# Patient Record
Sex: MALE | Race: WHITE | NOT HISPANIC OR LATINO | Employment: FULL TIME | URBAN - METROPOLITAN AREA
[De-identification: names, ages, dates, MRNs, and addresses within clinical notes are randomized per-mention and may not be internally consistent; named-entity substitution may affect disease eponyms.]

---

## 2019-05-29 ENCOUNTER — TRANSFERRED RECORDS (OUTPATIENT)
Dept: HEALTH INFORMATION MANAGEMENT | Facility: CLINIC | Age: 54
End: 2019-05-29

## 2019-12-17 ENCOUNTER — TRANSFERRED RECORDS (OUTPATIENT)
Dept: HEALTH INFORMATION MANAGEMENT | Facility: CLINIC | Age: 54
End: 2019-12-17

## 2022-03-31 ENCOUNTER — OFFICE VISIT (OUTPATIENT)
Dept: FAMILY MEDICINE | Facility: CLINIC | Age: 57
End: 2022-03-31
Payer: COMMERCIAL

## 2022-03-31 VITALS
HEART RATE: 81 BPM | HEIGHT: 72 IN | DIASTOLIC BLOOD PRESSURE: 72 MMHG | BODY MASS INDEX: 25.19 KG/M2 | OXYGEN SATURATION: 97 % | SYSTOLIC BLOOD PRESSURE: 128 MMHG | WEIGHT: 186 LBS

## 2022-03-31 DIAGNOSIS — I82.4Y2 ACUTE DEEP VEIN THROMBOSIS (DVT) OF PROXIMAL VEIN OF LEFT LOWER EXTREMITY (H): Primary | ICD-10-CM

## 2022-03-31 DIAGNOSIS — S99.912A INJURY OF LEFT ANKLE, INITIAL ENCOUNTER: ICD-10-CM

## 2022-03-31 PROCEDURE — 99203 OFFICE O/P NEW LOW 30 MIN: CPT | Performed by: FAMILY MEDICINE

## 2022-03-31 RX ORDER — ALBUTEROL SULFATE 90 UG/1
AEROSOL, METERED RESPIRATORY (INHALATION)
COMMUNITY
End: 2022-03-31

## 2022-03-31 RX ORDER — RIVAROXABAN 20 MG/1
TABLET, FILM COATED ORAL
COMMUNITY
Start: 2022-03-01 | End: 2022-03-31

## 2022-03-31 RX ORDER — RIVAROXABAN 20 MG/1
20 TABLET, FILM COATED ORAL
Qty: 30 TABLET | Refills: 3 | Status: SHIPPED | OUTPATIENT
Start: 2022-03-31 | End: 2023-04-21

## 2022-03-31 NOTE — PROGRESS NOTES
Assessment & Plan     (I82.4Y2) Acute deep vein thrombosis (DVT) of proximal vein of left lower extremity (H)  (primary encounter diagnosis)  Comment: pt has first DVT on left leg after he had left ankle injury and wear immobilized boot. Non smoker and no family history of dvt. He is taking xarelto and no side effect. He over took it for last week. No sob, cough, cp, dizziness etc. He needs to take anticoagulant for 4-6 month.    Plan: XARELTO ANTICOAGULANT 20 MG TABS tablet        Side effect addressed. If he has blood in urine, stool, nose bleeding etc he needs to call.     (S99.912A) Injury of left ankle, initial encounter  Comment: he played volleyball and fell and had left ankle Achilles tendon rupture. Due to the immobilized of the leg he formed DVT.    Plan: continue follow-up with ortho.    956}     BMI:   Estimated body mass index is 25.23 kg/m  as calculated from the following:    Height as of this encounter: 1.829 m (6').    Weight as of this encounter: 84.4 kg (186 lb).    Return if symptoms worsen or fail to improve.    Chloe Partida MD  [facility] ISRRAEL Wheat is a 56 year old who presents for the following health issues     HPI     Pt has dvt on left leg. Reviewed the chart pt had uc visit at 2/28 and Dxed DVT . Started xarelto. He finished the starter pack x 21 days. When he started the continue dose of 20 mg he mistakenly to take it twice a day for one week.   No previous dvt/pe, no family history of dvt/pe. Non smoker. Pt had ankle injury > one month ago  And wear the immobilized boot since.    No cough, sob, wheezing, fainting, cp, dizziness.       Review of Systems   Constitutional, HEENT, cardiovascular, pulmonary, gi and gu systems are negative, except as otherwise noted.      Objective    /72   Pulse 81   Ht 1.829 m (6')   Wt 84.4 kg (186 lb)   SpO2 97%   BMI 25.23 kg/m    Body mass index is 25.23 kg/m .  Physical Exam   GENERAL: healthy, alert  and no distress  NECK: no adenopathy, no asymmetry, masses, or scars and thyroid normal to palpation  RESP: lungs clear to auscultation - no rales, rhonchi or wheezes  CV: regular rate and rhythm, normal S1 S2, no S3 or S4, no murmur, click or rub, no peripheral edema and peripheral pulses strong    Left leg wear immobilized boot.            Answers for HPI/ROS submitted by the patient on 3/31/2022  How many servings of fruits and vegetables do you eat daily?: 0-1  On average, how many sweetened beverages do you drink each day (Examples: soda, juice, sweet tea, etc.  Do NOT count diet or artificially sweetened beverages)?: 1  How many minutes a day do you exercise enough to make your heart beat faster?: 30 to 60  How many days a week do you exercise enough to make your heart beat faster?: 3 or less  How many days per week do you miss taking your medication?: 0  What is the reason for your visit today?: Blood Clot

## 2022-03-31 NOTE — PATIENT INSTRUCTIONS
Patient Education     Complications of Deep Vein Thrombosis   Deep vein thrombosis (DVT) is a condition involving the formation of a blood clot or thrombus in a deep vein. One may develop in a large vein deep inside the leg, arm, or other part of the body. Complications from deep vein thrombosis can be very serious. They can include pulmonary embolism (PE), chronic venous insufficiency, and post-thrombotic syndrome.    You may hear healthcare providers use the term venous thromboembolism (VTE) to describe DVT and PE. They use the term VTE because the two conditions are very closely related. And, because their prevention and treatment are closely related.   Pulmonary embolism   Pulmonary embolism (PE) happens when part of the clot, called an embolus, separates from the vein. It travels to the lungs and cuts off the flow of blood. A PE may develop quickly. It's a medical emergency and may cause death.          Call 911  Call 911 if you have symptoms of a blood clot in the lungs. Symptoms may include:     Chest pain    Trouble breathing or sudden shortness of breath    Coughing (may cough up blood)    Fainting    Fast heartbeat    Sweating  You may have bleeding if you take medicine to help prevent blood clots. Call 911 if you have heavy or uncontrolled bleeding.   When to call your healthcare provider  Call your healthcare provider if you have symptoms of a blood clot. The symptoms include:     Swelling    Pain    Redness in your leg, arm, or other area  Call your healthcare provider if you have signs or symptoms of bleeding, like blood in the urine, bleeding with bowel movements, or bleeding from the nose, gums, a cut, or vagina.   Chronic venous insufficiency and post-thrombotic syndrome   Two other complications of deep vein thrombosis are chronic venous insufficiency and post-thrombotic syndrome.   Chronic venous insufficiency  may happen following deep vein thrombosis of a leg vein. It means that a vein no longer  works as well. It's a long-term condition where blood stays in the vein instead of flowing back to the heart. Pain and swelling in the leg are common symptoms.   Post-thrombotic syndrome may also happen following deep vein thrombosis of a leg vein. It's a long-term problem with pain, swelling, and redness. Ulcers and sores can also happen if the condition is not treated early. These complications and associated symptoms may make it hard to walk and take part in daily activities.   [employer] last reviewed this educational content on 10/1/2019    [id number] [employer]. All rights reserved. This information is not intended as a substitute for professional medical care. Always follow your healthcare professional's instructions.

## 2022-05-01 ENCOUNTER — HEALTH MAINTENANCE LETTER (OUTPATIENT)
Age: 57
End: 2022-05-01

## 2022-11-21 ENCOUNTER — HEALTH MAINTENANCE LETTER (OUTPATIENT)
Age: 57
End: 2022-11-21

## 2023-04-21 ENCOUNTER — OFFICE VISIT (OUTPATIENT)
Dept: FAMILY MEDICINE | Facility: CLINIC | Age: 58
End: 2023-04-21
Payer: COMMERCIAL

## 2023-04-21 VITALS
TEMPERATURE: 98.1 F | SYSTOLIC BLOOD PRESSURE: 144 MMHG | DIASTOLIC BLOOD PRESSURE: 86 MMHG | WEIGHT: 197.8 LBS | OXYGEN SATURATION: 99 % | HEART RATE: 60 BPM | BODY MASS INDEX: 26.79 KG/M2 | HEIGHT: 72 IN

## 2023-04-21 DIAGNOSIS — J30.9 ALLERGIC RHINITIS, UNSPECIFIED SEASONALITY, UNSPECIFIED TRIGGER: ICD-10-CM

## 2023-04-21 DIAGNOSIS — N52.9 ERECTILE DYSFUNCTION, UNSPECIFIED ERECTILE DYSFUNCTION TYPE: ICD-10-CM

## 2023-04-21 DIAGNOSIS — Z76.89 ENCOUNTER TO ESTABLISH CARE: ICD-10-CM

## 2023-04-21 DIAGNOSIS — Z86.718 HISTORY OF DVT (DEEP VEIN THROMBOSIS): ICD-10-CM

## 2023-04-21 DIAGNOSIS — R03.0 ELEVATED BLOOD PRESSURE READING WITHOUT DIAGNOSIS OF HYPERTENSION: ICD-10-CM

## 2023-04-21 DIAGNOSIS — Z13.1 SCREENING FOR DIABETES MELLITUS: ICD-10-CM

## 2023-04-21 DIAGNOSIS — R22.9 LOCALIZED SUPERFICIAL SWELLING, MASS, OR LUMP: ICD-10-CM

## 2023-04-21 DIAGNOSIS — Z13.220 LIPID SCREENING: ICD-10-CM

## 2023-04-21 DIAGNOSIS — M79.671 PAIN OF RIGHT HEEL: ICD-10-CM

## 2023-04-21 DIAGNOSIS — J45.30 MILD PERSISTENT ASTHMA WITHOUT COMPLICATION: Primary | ICD-10-CM

## 2023-04-21 PROBLEM — H40.003 GLAUCOMA SUSPECT, BILATERAL: Status: ACTIVE | Noted: 2023-04-21

## 2023-04-21 PROBLEM — I82.4Y2 ACUTE DEEP VEIN THROMBOSIS (DVT) OF PROXIMAL VEIN OF LEFT LOWER EXTREMITY (H): Status: ACTIVE | Noted: 2023-04-21

## 2023-04-21 PROBLEM — I82.4Y2 ACUTE DEEP VEIN THROMBOSIS (DVT) OF PROXIMAL VEIN OF LEFT LOWER EXTREMITY (H): Status: RESOLVED | Noted: 2023-04-21 | Resolved: 2023-04-21

## 2023-04-21 PROCEDURE — 99214 OFFICE O/P EST MOD 30 MIN: CPT | Performed by: PHYSICIAN ASSISTANT

## 2023-04-21 ASSESSMENT — PAIN SCALES - GENERAL: PAINLEVEL: NO PAIN (0)

## 2023-04-21 NOTE — PATIENT INSTRUCTIONS
How to Check Your Blood Pressure at Home    Make sure your blood pressure cuff is validated (produces accurate readings). Go to validateBP.org for a list of blood pressure cuffs.    Take your blood pressure 2 times right after you wake up and 2 times right before going to bed for a total of 4 readings in one day.  Make sure the readings are before you take your medications, smoke and after you empty your bladder.   Sit with good back support and feet flat on the floor. Have your blood pressure cuff resting at your heart level.

## 2023-04-21 NOTE — PROGRESS NOTES
Assessment & Plan     Encounter to establish care  Scheduled to come back in a few weeks for physical    Mild persistent asthma without complication  Allergic rhinitis, unspecified seasonality, unspecified trigger  Chronic, controlled. Follows with asthma/allergy.    Erectile dysfunction, unspecified erectile dysfunction type  Concern that erections are not as strong over the past year. No relationship concerns. BP elevated today but denies history of HTN. No history of diabetes, hyperlipidemia. Non-smoker. Alcohol - usually 1 daily, sometimes 2. Will check labs and follow-up per results.  - Lipid panel reflex to direct LDL Fasting; Future  - Comprehensive metabolic panel (BMP + Alb, Alk Phos, ALT, AST, Total. Bili, TP); Future  - Hemoglobin A1c; Future  - TSH with free T4 reflex; Future  - Testosterone, total; Future    Lipid screening  He will return for fasting labs.  - Lipid panel reflex to direct LDL Fasting; Future    Screening for diabetes mellitus  - Hemoglobin A1c; Future    Localized superficial swelling, mass, or lump  Suspect cyst vs lipoma, discussed benign nature of these. Will get US for evaluation. He is not sure if he wants removed but can refer for removal if desired.  - US [employer]; Future    Elevated blood pressure reading without diagnosis of hypertension  Denies history of HTN. Will recheck at upcoming physical.    History of DVT (deep vein thrombosis)  Left leg 3/2022 after he had left ankle injury and wore immobilized boot. Non smoker and no family history of DVT. He took xarelto for 6 monthsis taking xarelto and no side effect. He over took it for 6 months and did not need to continue after that.    Pain of right heel  He has had pain on right lateral heel intermittently for past few months. He plays hockey regularly and pain seems to be worse after wearing his hockey skates. Mild discomfort, no injury or trauma. No pain with walking or movement. No swelling, paresthesias. For now, he  would like to monitor as his hockey season is almost over and he will see if improve after he stops wearing skates. Follow-up if worsening or no improvement.      BMI:   Estimated body mass index is 26.83 kg/m  as calculated from the following:    Height as of this encounter: 1.829 m (6').    Weight as of this encounter: 89.7 kg (197 lb 12.8 oz).   Weight management plan: Discussed healthy diet and exercise guidelines      YOLANDE Hagan [facility]    Genesis Wheat is a 57 year old, presenting for the following health issues:  Mass (On torso)        4/21/2023     1:04 PM   Additional Questions   Roomed by VAL Albarado     History of Present Illness       Reason for visit:  Bump on side, checkup  Symptom onset:  More than a monthHe consumes 1 sweetened beverage(s) daily.He exercises with enough effort to increase his heart rate 60 or more minutes per day.  He exercises with enough effort to increase his heart rate 3 or less days per week. He is missing 1 dose(s) of medications per week.  He is not taking prescribed medications regularly due to remembering to take.       New patient - previous care through [facility]  Previous PCP retired  Lives in [place] and would like to be seen here moving forward.  Would like to schedule physical.    History of asthma - follows with pulmonology, Dr. Hammad Al, [facility] [place]. Taking breo ellipta daily and albuterol PRN. Asthma feels well controlled.         View : No data to display.              History of DVT left leg 3/2022 after he had left ankle injury and wore immobilized boot. Non smoker and no family history of DVT. He took xarelto for 6 monthsis taking xarelto and no side effect. He over took it for 6 months and did not need to continue after that.    He has had pain on right lateral heel intermittently for past few months. He plays hockey regularly and pain seems to be worse after  wearing his hockey skates. Mild discomfort, no injury or trauma. No pain with walking or movement. No swelling, paresthesias.    He has had a soft bump on his left side for at least several months. Not painful or bothersome. No redness, warmth, drainage.     Concern with erectile dysfunction. Over the past year, erections have not been as strong as previously. Still able to achieve and maintain erection. No relationship concerns. BP elevated today but denies history of HTN. No history of diabetes, hyperlipidemia. Non-smoker. Alcohol - usually 1 daily, sometimes 2.    Review of Systems   Constitutional, HEENT, cardiovascular, pulmonary, gi and gu systems are negative, except as otherwise noted.      Objective    BP (!) 144/86   Pulse 60   Temp 98.1  F (36.7  C) (Oral)   Ht 1.829 m (6')   Wt 89.7 kg (197 lb 12.8 oz)   SpO2 99%   BMI 26.83 kg/m    Body mass index is 26.83 kg/m .  Physical Exam   GENERAL: healthy, alert and no distress  RESP: lungs clear to auscultation - no rales, rhonchi or wheezes  CV: regular rate and rhythm, normal S1 S2, no S3 or S4, no murmur, click or rub, no peripheral edema and peripheral pulses strong  MS: no gross musculoskeletal defects noted, no edema. Left foot: full ankle ROM, no achilles tenderness, negative Trujillo test, normal DP/PT pulses, no bony tenderness or deformity.  SKIN: There is an approximately 2 cm in diameter soft tissue mass over the right side. It is soft, compressible, and slightly mobile. No erythema, other discoloration, or skin injury over the mass. Non-tender.  NEURO: Normal strength and tone, mentation intact and speech normal  PSYCH: mentation appears normal, affect normal/bright

## 2023-04-24 DIAGNOSIS — R22.9 LOCALIZED SUPERFICIAL SWELLING, MASS, OR LUMP: Primary | ICD-10-CM

## 2023-05-03 ENCOUNTER — HOSPITAL ENCOUNTER (OUTPATIENT)
Dept: ULTRASOUND IMAGING | Facility: CLINIC | Age: 58
Discharge: HOME | End: 2023-05-03
Attending: PHYSICIAN ASSISTANT | Admitting: PHYSICIAN ASSISTANT
Payer: COMMERCIAL

## 2023-05-03 DIAGNOSIS — R22.9 LOCALIZED SUPERFICIAL SWELLING, MASS, OR LUMP: ICD-10-CM

## 2023-05-03 PROCEDURE — 76705 ECHO EXAM OF ABDOMEN: CPT

## 2023-05-09 ASSESSMENT — ENCOUNTER SYMPTOMS
DIARRHEA: 0
CHILLS: 0
WEAKNESS: 0
SORE THROAT: 0
PARESTHESIAS: 0
FREQUENCY: 0
PALPITATIONS: 0
ARTHRALGIAS: 0
JOINT SWELLING: 0
SHORTNESS OF BREATH: 0
HEMATURIA: 0
HEMATOCHEZIA: 0
ABDOMINAL PAIN: 0
DYSURIA: 0
EYE PAIN: 0
CONSTIPATION: 0
MYALGIAS: 0
FEVER: 0
HEARTBURN: 0
COUGH: 0
NERVOUS/ANXIOUS: 0
DIZZINESS: 0
NAUSEA: 0
HEADACHES: 0

## 2023-05-10 ENCOUNTER — OFFICE VISIT (OUTPATIENT)
Dept: FAMILY MEDICINE | Facility: CLINIC | Age: 58
End: 2023-05-10
Payer: COMMERCIAL

## 2023-05-10 VITALS
BODY MASS INDEX: 26.14 KG/M2 | TEMPERATURE: 97.9 F | RESPIRATION RATE: 19 BRPM | WEIGHT: 193 LBS | HEIGHT: 72 IN | OXYGEN SATURATION: 98 % | DIASTOLIC BLOOD PRESSURE: 76 MMHG | HEART RATE: 52 BPM | SYSTOLIC BLOOD PRESSURE: 123 MMHG

## 2023-05-10 DIAGNOSIS — E66.3 OVERWEIGHT (BMI 25.0-29.9): ICD-10-CM

## 2023-05-10 DIAGNOSIS — Z00.00 ROUTINE GENERAL MEDICAL EXAMINATION AT A HEALTH CARE FACILITY: Primary | ICD-10-CM

## 2023-05-10 DIAGNOSIS — Z86.718 HISTORY OF DVT (DEEP VEIN THROMBOSIS): ICD-10-CM

## 2023-05-10 DIAGNOSIS — J45.30 MILD PERSISTENT ASTHMA WITHOUT COMPLICATION: ICD-10-CM

## 2023-05-10 DIAGNOSIS — Z12.5 SCREENING FOR PROSTATE CANCER: ICD-10-CM

## 2023-05-10 DIAGNOSIS — Z13.220 LIPID SCREENING: ICD-10-CM

## 2023-05-10 DIAGNOSIS — R79.89 ELEVATED SERUM CREATININE: Primary | ICD-10-CM

## 2023-05-10 DIAGNOSIS — Z13.1 SCREENING FOR DIABETES MELLITUS: ICD-10-CM

## 2023-05-10 DIAGNOSIS — N52.9 ERECTILE DYSFUNCTION, UNSPECIFIED ERECTILE DYSFUNCTION TYPE: ICD-10-CM

## 2023-05-10 LAB
ALBUMIN SERPL BCG-MCNC: 4.6 G/DL (ref 3.5–5.2)
ALP SERPL-CCNC: 70 U/L (ref 40–129)
ALT SERPL W P-5'-P-CCNC: 19 U/L (ref 10–50)
ANION GAP SERPL CALCULATED.3IONS-SCNC: 13 MMOL/L (ref 7–15)
AST SERPL W P-5'-P-CCNC: 37 U/L (ref 10–50)
BILIRUB SERPL-MCNC: 0.6 MG/DL
BUN SERPL-MCNC: 16.9 MG/DL (ref 6–20)
CALCIUM SERPL-MCNC: 9.2 MG/DL (ref 8.6–10)
CHLORIDE SERPL-SCNC: 104 MMOL/L (ref 98–107)
CHOLEST SERPL-MCNC: 218 MG/DL
CREAT SERPL-MCNC: 1.33 MG/DL (ref 0.67–1.17)
DEPRECATED HCO3 PLAS-SCNC: 25 MMOL/L (ref 22–29)
GFR SERPL CREATININE-BSD FRML MDRD: 62 ML/MIN/1.73M2
GLUCOSE SERPL-MCNC: 96 MG/DL (ref 70–99)
HBA1C MFR BLD: 5 % (ref 0–5.6)
HDLC SERPL-MCNC: 59 MG/DL
LDLC SERPL CALC-MCNC: 137 MG/DL
NONHDLC SERPL-MCNC: 159 MG/DL
POTASSIUM SERPL-SCNC: 4.6 MMOL/L (ref 3.4–5.3)
PROT SERPL-MCNC: 7.2 G/DL (ref 6.4–8.3)
PSA SERPL DL<=0.01 NG/ML-MCNC: 1.37 NG/ML (ref 0–3.5)
SODIUM SERPL-SCNC: 142 MMOL/L (ref 136–145)
TRIGL SERPL-MCNC: 108 MG/DL
TSH SERPL DL<=0.005 MIU/L-ACNC: 1.66 UIU/ML (ref 0.3–4.2)

## 2023-05-10 PROCEDURE — 99214 OFFICE O/P EST MOD 30 MIN: CPT | Mod: 25 | Performed by: PHYSICIAN ASSISTANT

## 2023-05-10 PROCEDURE — 0134A COVID-19 BIVALENT 18+ (MODERNA): CPT | Performed by: PHYSICIAN ASSISTANT

## 2023-05-10 PROCEDURE — 90677 PCV20 VACCINE IM: CPT | Performed by: PHYSICIAN ASSISTANT

## 2023-05-10 PROCEDURE — 84443 ASSAY THYROID STIM HORMONE: CPT | Performed by: PHYSICIAN ASSISTANT

## 2023-05-10 PROCEDURE — 91313 COVID-19 BIVALENT 18+ (MODERNA): CPT | Performed by: PHYSICIAN ASSISTANT

## 2023-05-10 PROCEDURE — 90746 HEPB VACCINE 3 DOSE ADULT IM: CPT | Performed by: PHYSICIAN ASSISTANT

## 2023-05-10 PROCEDURE — 80061 LIPID PANEL: CPT | Performed by: PHYSICIAN ASSISTANT

## 2023-05-10 PROCEDURE — 90472 IMMUNIZATION ADMIN EACH ADD: CPT | Performed by: PHYSICIAN ASSISTANT

## 2023-05-10 PROCEDURE — 36415 COLL VENOUS BLD VENIPUNCTURE: CPT | Performed by: PHYSICIAN ASSISTANT

## 2023-05-10 PROCEDURE — G0103 PSA SCREENING: HCPCS | Performed by: PHYSICIAN ASSISTANT

## 2023-05-10 PROCEDURE — 90471 IMMUNIZATION ADMIN: CPT | Performed by: PHYSICIAN ASSISTANT

## 2023-05-10 PROCEDURE — 80053 COMPREHEN METABOLIC PANEL: CPT | Performed by: PHYSICIAN ASSISTANT

## 2023-05-10 PROCEDURE — 84403 ASSAY OF TOTAL TESTOSTERONE: CPT | Performed by: PHYSICIAN ASSISTANT

## 2023-05-10 PROCEDURE — 83036 HEMOGLOBIN GLYCOSYLATED A1C: CPT | Performed by: PHYSICIAN ASSISTANT

## 2023-05-10 PROCEDURE — 99396 PREV VISIT EST AGE 40-64: CPT | Mod: 25 | Performed by: PHYSICIAN ASSISTANT

## 2023-05-10 RX ORDER — SILDENAFIL 50 MG/1
50 TABLET, FILM COATED ORAL PRN
Qty: 12 TABLET | Refills: 1 | Status: SHIPPED | OUTPATIENT
Start: 2023-05-10 | End: 2023-11-17

## 2023-05-10 ASSESSMENT — ENCOUNTER SYMPTOMS
HEADACHES: 0
FEVER: 0
DIZZINESS: 0
WEAKNESS: 0
HEMATOCHEZIA: 0
PALPITATIONS: 0
COUGH: 0
ABDOMINAL PAIN: 0
DYSURIA: 0
NERVOUS/ANXIOUS: 0
CONSTIPATION: 0
CHILLS: 0
ARTHRALGIAS: 0
SHORTNESS OF BREATH: 0
JOINT SWELLING: 0
MYALGIAS: 0
NAUSEA: 0
HEMATURIA: 0
SORE THROAT: 0
PARESTHESIAS: 0
HEARTBURN: 0
DIARRHEA: 0
FREQUENCY: 0
EYE PAIN: 0

## 2023-05-10 NOTE — LETTER
My Asthma Action Plan    Name: Jarret Hopkins   YOB: 1965  Date: 5/10/2023   My doctor: Missy Mary PA-C   My clinic: [facility]        My Control Medicine: Fluticasone furoate + vilanterol (Breo Ellipta)  -  100/25mcg    My Rescue Medicine: Albuterol (Proair/Ventolin/Proventil HFA) 2-4 puffs EVERY 4 HOURS as needed. Use a spacer if recommended by your provider. My Asthma Severity:   Mild Persistent  Know your asthma triggers:                GREEN ZONE   Good Control  I feel good  No cough or wheeze  Can work, sleep and play without asthma symptoms       Take your asthma control medicine every day.     If exercise triggers your asthma, take your rescue medication  15 minutes before exercise or sports, and  During exercise if you have asthma symptoms  Spacer to use with inhaler: If you have a spacer, make sure to use it with your inhaler             YELLOW ZONE Getting Worse  I have ANY of these:  I do not feel good  Cough or wheeze  Chest feels tight  Wake up at night   Keep taking your Green Zone medications  Start taking your rescue medicine:  every 20 minutes for up to 1 hour. Then every 4 hours for 24-48 hours.  If you stay in the Yellow Zone for more than 12-24 hours, contact your doctor.  If you do not return to the Green Zone in 12-24 hours or you get worse, start taking your oral steroid medicine if prescribed by your provider.           RED ZONE Medical Alert - Get Help  I have ANY of these:  I feel awful  Medicine is not helping  Breathing getting harder  Trouble walking or talking  Nose opens wide to breathe       Take your rescue medicine NOW  If your provider has prescribed an oral steroid medicine, start taking it NOW  Call your doctor NOW  If you are still in the Red Zone after 20 minutes and you have not reached your doctor:  Take your rescue medicine again and  Call 911 or go to the emergency room right away    See your regular doctor within 2 weeks of  an Emergency Room or Urgent Care visit for follow-up treatment.          Annual Reminders:  Meet with Asthma Educator,  Flu Shot in the Fall, consider Pneumonia Vaccination for patients with asthma (aged 19 and older).    Pharmacy: [id number] - [place], [place] - [address] AT [address] [place] [id number] & [facility]    Electronically signed by Missy Mary PA-C   Date: 05/10/23                      Asthma Triggers  How To Control Things That Make Your Asthma Worse    Triggers are things that make your asthma worse.  Look at the list below to help you find your triggers and what you can do about them.  You can help prevent asthma flare-ups by staying away from your triggers.      Trigger                                                          What you can do   Cigarette Smoke  Tobacco smoke can make asthma worse. Do not allow smoking in your home, car or around you.  Be sure no one smokes at a child s day care or school.  If you smoke, ask your health care provider for ways to help you quit.  Ask family members to quit too.  Ask your health care provider for a referral to Quit Plan to help you quit smoking, or call 9-728-030-PLAN.     Colds, Flu, Bronchitis  These are common triggers of asthma. Wash your hands often.  Don t touch your eyes, nose or mouth.  Get a flu shot every year.     Dust Mites  These are tiny bugs that live in cloth or carpet. They are too small to see. Wash sheets and blankets in hot water every week.   Encase pillows and mattress in dust mite proof covers.  Avoid having carpet if you can. If you have carpet, vacuum weekly.   Use a dust mask and HEPA vacuum.   Pollen and Outdoor Mold  Some people are allergic to trees, grass, or weed pollen, or molds. Try to keep your windows closed.  Limit time out doors when pollen count is high.   Ask you health care provider about taking medicine during allergy season.     Animal Dander  Some people are allergic to skin flakes, urine or  saliva from pets with fur or feathers. Keep pets with fur or feathers out of your home.    If you can t keep the pet outdoors, then keep the pet out of your bedroom.  Keep the bedroom door closed.  Keep pets off cloth furniture and away from stuffed toys.     Mice, Rats, and Cockroaches   Some people are allergic to the waste from these pests.   Cover food and garbage.  Clean up spills and food crumbs.  Store grease in the refrigerator.   Keep food out of the bedroom.   Indoor Mold  This can be a trigger if your home has high moisture. Fix leaking faucets, pipes, or other sources of water.   Clean moldy surfaces.  Dehumidify basement if it is damp and smelly.   Smoke, Strong Odors, and Sprays  These can reduce air quality. Stay away from strong odors and sprays, such as perfume, powder, hair spray, paints, smoke incense, paint, cleaning products, candles and new carpet.   Exercise or Sports  Some people with asthma have this trigger. Be active!  Ask your doctor about taking medicine before sports or exercise to prevent symptoms.    Warm up for 5-10 minutes before and after sports or exercise.     Other Triggers of Asthma  Cold air:  Cover your nose and mouth with a scarf.  Sometimes laughing or crying can be a trigger.  Some medicines and food can trigger asthma.

## 2023-05-10 NOTE — PROGRESS NOTES
SUBJECTIVE:   CC: Jarret is an 57 year old who presents for preventative health visit.       5/10/2023     8:39 AM   Additional Questions   Roomed by marya wilkinson         5/10/2023     8:39 AM   Patient Reported Additional Medications   Patient reports taking the following new medications none     Patient has been advised of split billing requirements and indicates understanding: Yes  Healthy Habits:     Getting at least 3 servings of Calcium per day:  Yes    Bi-annual eye exam:  Yes    Dental care twice a year:  Yes    Sleep apnea or symptoms of sleep apnea:  None    Diet:  Regular (no restrictions)    Frequency of exercise:  2-3 days/week    Duration of exercise:  15-30 minutes    Taking medications regularly:  Yes    Medication side effects:  None    PHQ-2 Total Score: 0    Additional concerns today:  Yes    History of asthma - follows with pulmonology, Dr. Hammad Al, [facility] [place]. Taking breo ellipta daily and albuterol PRN. Asthma feels well controlled        5/11/2023    10:07 AM   ACT Total Scores   ACT TOTAL SCORE (Goal Greater than or Equal to 20) 24   In the past 12 months, how many times did you visit the emergency room for your asthma without being admitted to the hospital? 0   In the past 12 months, how many times were you hospitalized overnight because of your asthma? 0     History of DVT left leg 3/2022 after he had left ankle injury and wore immobilized boot. Non smoker and no family history of DVT.  He took xarelto for for 6 months and did not need to continue after that.    Today's PHQ-2 Score:       5/9/2023    10:50 AM   PHQ-2 ( 1999 [employer])   Q1: Little interest or pleasure in doing things 0   Q2: Feeling down, depressed or hopeless 0   PHQ-2 Score 0   Q1: Little interest or pleasure in doing things Not at all   Q2: Feeling down, depressed or hopeless Not at all   PHQ-2 Score 0      Concern with erectile dysfunction. Over the past year, erections have not been as strong as  previously. Still able to achieve and maintain erection. No relationship concerns. No history of HTN. No history of diabetes, hyperlipidemia. Non-smoker. Alcohol - usually 1 daily, sometimes 2. Would like to try Viagra. He does not take any nitrates.    Have you ever done Advance Care Planning? (For example, a Health Directive, POLST, or a discussion with a medical provider or your loved ones about your wishes): No, advance care planning information given to patient to review.  Patient plans to discuss their wishes with loved ones or provider.      Social History     Tobacco Use     Smoking status: Never     Smokeless tobacco: Never   Vaping Use     Vaping status: Never Used   Substance Use Topics     Alcohol use: Yes     Alcohol/week: 7.0 - 10.0 standard drinks of alcohol     Types: 7 - 10 Standard drinks or equivalent per week     Comment: usually 1 drink per day, sometimes 2 drinks per day             5/9/2023    10:50 AM   Alcohol Use   Prescreen: >3 drinks/day or >7 drinks/week? Yes         5/9/2023    10:50 AM   AUDIT - Alcohol Use Disorders Identification Test - Reproduced from the World Health Organization Audit 2001 (Second Edition)   1.  How often do you have a drink containing alcohol? 2 to 3 times a week   2.  How many drinks containing alcohol do you have on a typical day when you are drinking? 1 or 2   3.  How often do you have five or more drinks on one occasion? Never   9.  Have you or someone else been injured because of your drinking? No   10. Has a relative, friend, doctor or other health care worker been concerned about your drinking or suggested you cut down? Yes, during the last year       Last PSA:   Prostate Specific Antigen Screen   Date Value Ref Range Status   05/10/2023 1.37 0.00 - 3.50 ng/mL Final       Reviewed orders with patient. Reviewed health maintenance and updated orders accordingly - Yes  Lab work is in process  Labs reviewed in EPIC  BP Readings from Last 3 Encounters:    05/10/23 123/76   04/21/23 (!) 144/86   03/31/22 128/72    Wt Readings from Last 3 Encounters:   05/10/23 87.5 kg (193 lb)   04/21/23 89.7 kg (197 lb 12.8 oz)   03/31/22 84.4 kg (186 lb)                  Patient Active Problem List   Diagnosis     History of DVT (deep vein thrombosis)     Mild persistent asthma without complication     Allergic rhinitis, unspecified seasonality, unspecified trigger     Glaucoma suspect, bilateral     Erectile dysfunction, unspecified erectile dysfunction type     Overweight (BMI 25.0-29.9)     Past Surgical History:   Procedure Laterality Date     COLONOSCOPY N/A 09/01/2016    Procedure: COLONOSCOPY;  Surgeon: Baldomero Colón MD;  Location: [facility]     ELBOW SURGERY       LASIK Right      ORTHOPEDIC SURGERY      Left knee     ORTHOPEDIC SURGERY Left     ACL repair       Social History     Tobacco Use     Smoking status: Never     Smokeless tobacco: Never   Vaping Use     Vaping status: Never Used   Substance Use Topics     Alcohol use: Yes     Alcohol/week: 7.0 - 10.0 standard drinks of alcohol     Types: 7 - 10 Standard drinks or equivalent per week     Comment: usually 1 drink per day, sometimes 2 drinks per day     Family History   Problem Relation Age of Onset     Heart Disease Mother      Diabetes Father         develop in 80s     Hypertension Father         develop in 80s     Coronary Stenting Brother 59     Coronary Artery Disease Brother         2014 - stint     Colon Cancer No family hx of      Prostate Cancer No family hx of      Breast Cancer No family hx of          Current Outpatient Medications   Medication Sig Dispense Refill     albuterol (PROAIR HFA, PROVENTIL HFA, VENTOLIN HFA) 108 (90 BASE) MCG/ACT inhaler Inhale 2 puffs into the lungs every 6 hours       BREO ELLIPTA 100-25 MCG/INH inhaler INHALE 1 PUFF BY MOUTH EVERY DAY       sildenafil (VIAGRA) 50 MG tablet Take 1 tablet (50 mg) by mouth as needed (erectile dysfunction) Reduce to 25 mg if side effects  occur, can increase to maximum of 100 mg if tolerating well but incomplete response. Take 1 hour prior to sexual activity, take on empty stomach, avoid grapefruit/grapefruit juice and alcohol. Do not take with nitrates, nitroglycerin. 12 tablet 1     No Known Allergies  Recent Labs   Lab Test 05/10/23  0946   A1C 5.0   *   HDL 59   TRIG 108   ALT 19   CR 1.33*   GFRESTIMATED 62   POTASSIUM 4.6   TSH 1.66        Reviewed and updated as needed this visit by clinical staff   Tobacco  Allergies  Meds  Problems  Med Hx  Surg Hx  Fam Hx          Reviewed and updated as needed this visit by Provider   Tobacco  Allergies  Meds  Problems  Med Hx  Surg Hx  Fam Hx         Past Medical History:   Diagnosis Date     Acute deep vein thrombosis (DVT) of proximal vein of left lower extremity (H) 04/21/2023     Benign essential microscopic hematuria      Bursitis of left hip      External hemorrhoids      Hypertension 05/2023     Uncomplicated asthma       Past Surgical History:   Procedure Laterality Date     COLONOSCOPY N/A 09/01/2016    Procedure: COLONOSCOPY;  Surgeon: Baldomero Colón MD;  Location:  GI     ELBOW SURGERY       LASIK Right      ORTHOPEDIC SURGERY      Left knee     ORTHOPEDIC SURGERY Left     ACL repair       Review of Systems   Constitutional: Negative for chills and fever.   HENT: Positive for congestion. Negative for ear pain, hearing loss and sore throat.    Eyes: Negative for pain and visual disturbance.   Respiratory: Negative for cough and shortness of breath.    Cardiovascular: Negative for chest pain, palpitations and peripheral edema.   Gastrointestinal: Negative for abdominal pain, constipation, diarrhea, heartburn, hematochezia and nausea.   Genitourinary: Positive for impotence. Negative for dysuria, frequency, genital sores, hematuria, penile discharge and urgency.   Musculoskeletal: Negative for arthralgias, joint swelling and myalgias.   Skin: Negative for rash.    Neurological: Negative for dizziness, weakness, headaches and paresthesias.   Psychiatric/Behavioral: Negative for mood changes. The patient is not nervous/anxious.        OBJECTIVE:   /76 (BP Location: Right arm, Patient Position: Sitting, Cuff Size: Adult Large)   Pulse 52   Temp 97.9  F (36.6  C) (Tympanic)   Resp 19   Ht 1.829 m (6')   Wt 87.5 kg (193 lb)   SpO2 98%   BMI 26.18 kg/m      Physical Exam  GENERAL: healthy, alert and no distress  EYES: Eyes grossly normal to inspection, PERRL and conjunctivae and sclerae normal  HENT: ear canals and TM's normal, nose and mouth without ulcers or lesions  NECK: no adenopathy, no asymmetry, masses, or scars and thyroid normal to palpation  RESP: lungs clear to auscultation - no rales, rhonchi or wheezes  CV: regular rate and rhythm, normal S1 S2, no S3 or S4, no murmur, click or rub, no peripheral edema and peripheral pulses strong  ABDOMEN: soft, nontender, no hepatosplenomegaly, no masses and bowel sounds normal  MS: no gross musculoskeletal defects noted, no edema  SKIN: no suspicious lesions or rashes; approximately 2 cm in diameter soft tissue mass over the right side. It is soft, compressible, and slightly mobile. No erythema, other discoloration, or skin injury over the mass. Non-tender.  NEURO: Normal strength and tone, mentation intact and speech normal  PSYCH: mentation appears normal, affect normal/bright    Diagnostic Test Results:  Labs reviewed in Epic    ASSESSMENT/PLAN:   Routine general medical examination at a health care facility  Reviewed personal and family history. Reviewed age appropriate screenings. Reviewed healthy BP and BMI ranges. Counseled on lifestyle modifications for optimal mental and physical health.  Discussed age-appropriate health maintenance. Recommended any needed vaccinations. Continue to focus on well balanced diet and exercise.     Screening for prostate cancer  Discussed, would like to screen  - PSA, screen;  Future  - PSA, screen    Mild persistent asthma without complication  Chronic, stable. Follows with pulmonology.    Erectile dysfunction, unspecified erectile dysfunction type  Discussed options, would like to try Viagra. Will start with 50 mg; reduce to 25 mg if side effects occur, can increase to maximum of 100 mg if tolerating well but incomplete response. Discussed that he should take 1 hour prior to sexual activity, take on empty stomach, avoid grapefruit/grapefruit juice and alcohol. Discussed risks/benefits/side effects. He does not take any nitrates. We are also checking labs below.  - sildenafil (VIAGRA) 50 MG tablet; Take 1 tablet (50 mg) by mouth as needed (erectile dysfunction) Reduce to 25 mg if side effects occur, can increase to maximum of 100 mg if tolerating well but incomplete response. Take 1 hour prior to sexual activity, take on empty stomach, avoid grapefruit/grapefruit juice and alcohol. Do not take with nitrates, nitroglycerin.  - Lipid panel reflex to direct LDL Fasting  - Comprehensive metabolic panel (BMP + Alb, Alk Phos, ALT, AST, Total. Bili, TP)  - Hemoglobin A1c  - TSH with free T4 reflex  - Testosterone, total    Lipid screening  He is fasting today  - Lipid panel reflex to direct LDL Fasting    Screening for diabetes mellitus  - Hemoglobin A1c    History of DVT (deep vein thrombosis)  History of DVT left leg 3/2022 after he had left ankle injury and wore immobilized boot. Non smoker and no family history of DVT.  He took xarelto for for 6 months and did not need to continue after that.    Overweight (BMI 25.0-29.9)  Discussed healthy diet/exercise        COUNSELING:   Reviewed preventive health counseling, as reflected in patient instructions        He reports that he has never smoked. He has never used smokeless tobacco.            YOLANDE Hagan [facility]

## 2023-05-11 PROBLEM — N52.9 ERECTILE DYSFUNCTION, UNSPECIFIED ERECTILE DYSFUNCTION TYPE: Status: ACTIVE | Noted: 2023-05-11

## 2023-05-11 PROBLEM — E66.3 OVERWEIGHT (BMI 25.0-29.9): Status: ACTIVE | Noted: 2023-05-11

## 2023-05-11 ASSESSMENT — ASTHMA QUESTIONNAIRES: ACT_TOTALSCORE: 24

## 2023-05-12 LAB — TESTOST SERPL-MCNC: 376 NG/DL (ref 240–950)

## 2023-11-17 DIAGNOSIS — N52.9 ERECTILE DYSFUNCTION, UNSPECIFIED ERECTILE DYSFUNCTION TYPE: ICD-10-CM

## 2023-11-17 RX ORDER — SILDENAFIL 50 MG/1
TABLET, FILM COATED ORAL
Qty: 12 TABLET | Refills: 1 | Status: SHIPPED | OUTPATIENT
Start: 2023-11-17 | End: 2024-05-13

## 2023-11-17 NOTE — TELEPHONE ENCOUNTER
Prescription approved per [facility] Refill Protocol.  Caity Vizcaino, RN  [facility] Triage Nurse

## 2024-02-22 ENCOUNTER — VIRTUAL VISIT (OUTPATIENT)
Dept: FAMILY MEDICINE | Facility: CLINIC | Age: 59
End: 2024-02-22
Payer: COMMERCIAL

## 2024-02-22 DIAGNOSIS — J01.10 ACUTE NON-RECURRENT FRONTAL SINUSITIS: Primary | ICD-10-CM

## 2024-02-22 PROCEDURE — 99213 OFFICE O/P EST LOW 20 MIN: CPT | Mod: 95 | Performed by: NURSE PRACTITIONER

## 2024-02-22 ASSESSMENT — ASTHMA QUESTIONNAIRES
QUESTION_5 LAST FOUR WEEKS HOW WOULD YOU RATE YOUR ASTHMA CONTROL: COMPLETELY CONTROLLED
QUESTION_4 LAST FOUR WEEKS HOW OFTEN HAVE YOU USED YOUR RESCUE INHALER OR NEBULIZER MEDICATION (SUCH AS ALBUTEROL): NOT AT ALL
QUESTION_3 LAST FOUR WEEKS HOW OFTEN DID YOUR ASTHMA SYMPTOMS (WHEEZING, COUGHING, SHORTNESS OF BREATH, CHEST TIGHTNESS OR PAIN) WAKE YOU UP AT NIGHT OR EARLIER THAN USUAL IN THE MORNING: NOT AT ALL
QUESTION_1 LAST FOUR WEEKS HOW MUCH OF THE TIME DID YOUR ASTHMA KEEP YOU FROM GETTING AS MUCH DONE AT WORK, SCHOOL OR AT HOME: NONE OF THE TIME
QUESTION_2 LAST FOUR WEEKS HOW OFTEN HAVE YOU HAD SHORTNESS OF BREATH: NOT AT ALL
ACT_TOTALSCORE: 25
QUESTION_1 LAST FOUR WEEKS HOW MUCH OF THE TIME DID YOUR ASTHMA KEEP YOU FROM GETTING AS MUCH DONE AT WORK, SCHOOL OR AT HOME: NONE OF THE TIME
QUESTION_4 LAST FOUR WEEKS HOW OFTEN HAVE YOU USED YOUR RESCUE INHALER OR NEBULIZER MEDICATION (SUCH AS ALBUTEROL): NOT AT ALL
ACT_TOTALSCORE: 25
QUESTION_2 LAST FOUR WEEKS HOW OFTEN HAVE YOU HAD SHORTNESS OF BREATH: NOT AT ALL
ACT_TOTALSCORE: 24
QUESTION_5 LAST FOUR WEEKS HOW WOULD YOU RATE YOUR ASTHMA CONTROL: WELL CONTROLLED
QUESTION_3 LAST FOUR WEEKS HOW OFTEN DID YOUR ASTHMA SYMPTOMS (WHEEZING, COUGHING, SHORTNESS OF BREATH, CHEST TIGHTNESS OR PAIN) WAKE YOU UP AT NIGHT OR EARLIER THAN USUAL IN THE MORNING: NOT AT ALL

## 2024-02-22 ASSESSMENT — ENCOUNTER SYMPTOMS: SORE THROAT: 1

## 2024-02-22 NOTE — PROGRESS NOTES
Jarret is a 58 year old who is being evaluated via a billable video visit.      How would you like to obtain your AVS? MyChart  If the video visit is dropped, the invitation should be resent by: Text to cell phone: [phone]  Will anyone else be joining your video visit? No          Assessment & Plan     Acute non-recurrent frontal sinusitis  Reviewed treatment plan and role of antibiotics  Instructed to call or return for :  --symptoms not improved in 3 days  --Worsening fever or headaches  --new, unexplained symptoms develop  Encouraged distilled water only and Fany pot.  Over-the-counter Flonase  - amoxicillin-clavulanate (AUGMENTIN) 875-125 MG tablet; Take 1 tablet by mouth 2 times daily        BMI  Estimated body mass index is 26.18 kg/m  as calculated from the following:    Height as of 5/10/23: 1.829 m (6').    Weight as of 5/10/23: 87.5 kg (193 lb).         Subjective   Jarret is a 58 year old, presenting for the following health issues:  Pharyngitis (Congestion ) and Sinus Problem (Possible sinus infection/)      2/22/2024    12:40 PM   Additional Questions   Roomed by Rosi   Accompanied by Self     History of Present Illness       Reason for visit:  Cold symptoms  Symptom onset:  More than a month  Symptoms include:  Sore throat, congestion  Symptom intensity:  Moderate  Symptom progression:  Worsening  Had these symptoms before:  Yes  Has tried/received treatment for these symptoms:  No  What makes it worse:  No  What makes it better:  No    He eats 2-3 servings of fruits and vegetables daily.He consumes 0 sweetened beverage(s) daily.He exercises with enough effort to increase his heart rate 20 to 29 minutes per day.  He exercises with enough effort to increase his heart rate 5 days per week.   He is taking medications regularly.       Video visit completed.     Has not been feeling well for the 7 weeks. Started with bad sore throat. Felt like throat was on fire. Then got bad sinus and chest  discomfort. Was coughing a lot. Week of Jan 8th felt kind of poorly and had to take a day off of work. 1/18/24 did virtual visit. Nyquil, Dayquil,  mucinex DM. 1/29 thorough was really feeling better and doing well. Then on 2/2/24 started to feel poorly again. Using neti pot to clean sinus. Using tap water. Ears feel full. No dizziness. Thought was getting better again. Then today started to feel poorly again. Feels drained. No fever that is aware of. Drainage from nose is very thick.         Objective           Vitals:  No vitals were obtained today due to virtual visit.    Physical Exam  Constitutional:       General: He is not in acute distress.     Appearance: Normal appearance. He is not toxic-appearing.   HENT:      Nose: No congestion.   Eyes:      General: Lids are normal.   Pulmonary:      Effort: Pulmonary effort is normal. No tachypnea, bradypnea or respiratory distress.   Skin:     Coloration: Skin is not ashen, cyanotic, jaundiced or pale.   Neurological:      Mental Status: He is alert.   Psychiatric:         Mood and Affect: Mood normal.         Speech: Speech normal.         Behavior: Behavior normal. Behavior is cooperative.              Video-Visit Details    Type of service:  Video Visit     Originating Location (pt. Location): Home    Distant Location (provider location):  Off-site  Platform used for Video Visit: Reed  Signed Electronically by: CINDY Cronin CNP

## 2024-05-13 DIAGNOSIS — N52.9 ERECTILE DYSFUNCTION, UNSPECIFIED ERECTILE DYSFUNCTION TYPE: ICD-10-CM

## 2024-05-13 RX ORDER — SILDENAFIL 50 MG/1
TABLET, FILM COATED ORAL
Qty: 12 TABLET | Refills: 1 | Status: SHIPPED | OUTPATIENT
Start: 2024-05-13 | End: 2024-08-21

## 2024-05-24 SDOH — HEALTH STABILITY: PHYSICAL HEALTH: ON AVERAGE, HOW MANY DAYS PER WEEK DO YOU ENGAGE IN MODERATE TO STRENUOUS EXERCISE (LIKE A BRISK WALK)?: 2 DAYS

## 2024-05-24 SDOH — HEALTH STABILITY: PHYSICAL HEALTH: ON AVERAGE, HOW MANY MINUTES DO YOU ENGAGE IN EXERCISE AT THIS LEVEL?: 60 MIN

## 2024-05-24 ASSESSMENT — SOCIAL DETERMINANTS OF HEALTH (SDOH): HOW OFTEN DO YOU GET TOGETHER WITH FRIENDS OR RELATIVES?: ONCE A WEEK

## 2024-05-29 ENCOUNTER — OFFICE VISIT (OUTPATIENT)
Dept: FAMILY MEDICINE | Facility: CLINIC | Age: 59
End: 2024-05-29
Payer: COMMERCIAL

## 2024-05-29 VITALS
WEIGHT: 200 LBS | OXYGEN SATURATION: 98 % | DIASTOLIC BLOOD PRESSURE: 84 MMHG | HEART RATE: 52 BPM | HEIGHT: 72 IN | TEMPERATURE: 97.9 F | SYSTOLIC BLOOD PRESSURE: 130 MMHG | RESPIRATION RATE: 16 BRPM | BODY MASS INDEX: 27.09 KG/M2

## 2024-05-29 DIAGNOSIS — N52.9 ERECTILE DYSFUNCTION, UNSPECIFIED ERECTILE DYSFUNCTION TYPE: ICD-10-CM

## 2024-05-29 DIAGNOSIS — R03.0 ELEVATED BP WITHOUT DIAGNOSIS OF HYPERTENSION: ICD-10-CM

## 2024-05-29 DIAGNOSIS — Z12.5 SCREENING FOR PROSTATE CANCER: ICD-10-CM

## 2024-05-29 DIAGNOSIS — Z13.220 LIPID SCREENING: ICD-10-CM

## 2024-05-29 DIAGNOSIS — Z00.00 ROUTINE GENERAL MEDICAL EXAMINATION AT A HEALTH CARE FACILITY: Primary | ICD-10-CM

## 2024-05-29 DIAGNOSIS — E66.3 OVERWEIGHT (BMI 25.0-29.9): ICD-10-CM

## 2024-05-29 PROCEDURE — 99396 PREV VISIT EST AGE 40-64: CPT | Mod: 25 | Performed by: PHYSICIAN ASSISTANT

## 2024-05-29 PROCEDURE — 90746 HEPB VACCINE 3 DOSE ADULT IM: CPT | Performed by: PHYSICIAN ASSISTANT

## 2024-05-29 PROCEDURE — 90472 IMMUNIZATION ADMIN EACH ADD: CPT | Performed by: PHYSICIAN ASSISTANT

## 2024-05-29 PROCEDURE — 90480 ADMN SARSCOV2 VAC 1/ONLY CMP: CPT | Performed by: PHYSICIAN ASSISTANT

## 2024-05-29 PROCEDURE — 36415 COLL VENOUS BLD VENIPUNCTURE: CPT | Performed by: PHYSICIAN ASSISTANT

## 2024-05-29 PROCEDURE — 80048 BASIC METABOLIC PNL TOTAL CA: CPT | Performed by: PHYSICIAN ASSISTANT

## 2024-05-29 PROCEDURE — G0103 PSA SCREENING: HCPCS | Performed by: PHYSICIAN ASSISTANT

## 2024-05-29 PROCEDURE — 91320 SARSCV2 VAC 30MCG TRS-SUC IM: CPT | Performed by: PHYSICIAN ASSISTANT

## 2024-05-29 PROCEDURE — 90471 IMMUNIZATION ADMIN: CPT | Performed by: PHYSICIAN ASSISTANT

## 2024-05-29 PROCEDURE — 80061 LIPID PANEL: CPT | Performed by: PHYSICIAN ASSISTANT

## 2024-05-29 PROCEDURE — 90715 TDAP VACCINE 7 YRS/> IM: CPT | Performed by: PHYSICIAN ASSISTANT

## 2024-05-29 NOTE — PROGRESS NOTES
Preventive Care Visit  [facility] ALLYSONMOJAMISON Mary PA-C, Family Medicine  May 29, 2024      Assessment & Plan     Routine general medical examination at a health care facility  - Basic metabolic panel  (Ca, Cl, CO2, Creat, Gluc, K, Na, BUN); Future  - Basic metabolic panel  (Ca, Cl, CO2, Creat, Gluc, K, Na, BUN)    Lipid screening  He is fasting today  - Lipid panel reflex to direct LDL Fasting; Future  - Lipid panel reflex to direct LDL Fasting    Screening for prostate cancer  Discussed, agrees  - PSA, screen; Future  - PSA, screen    Elevated BP without diagnosis of hypertension  Elevated initially but lower on recheck today. BP was also elevated 4/2023 but normal 5/2023. Discussed healthy lifestyle, recommend monitoring BP at home and follow-up if elevated.    Erectile dysfunction, unspecified erectile dysfunction type  Uses sildenafil 50 mg PRN, works well, no side effects or concerns. Does not need refill today but can call for refills when needed.    Overweight (BMI 25.0-29.9)  BMI  Estimated body mass index is 27.12 kg/m  as calculated from the following:    Height as of this encounter: 1.829 m (6').    Weight as of this encounter: 90.7 kg (200 lb).   Weight management plan: Discussed healthy diet and exercise guidelines    Counseling  Appropriate preventive services were discussed with this patient.  Checklist reviewing preventive services available has been given to the patient.      Genesis Wheat is a 58 year old, presenting for the following:  Physical (Not fasting)        5/29/2024     2:16 PM   Additional Questions   Roomed by shaye          HPI    History of asthma - follows with pulmonology, Dr. Hammad Al, [facility]. Taking breo ellipta daily and albuterol PRN. Asthma feels well controlled.      5/11/2023    10:07 AM 2/22/2024    12:40 PM 2/22/2024    12:41 PM   ACT Total Scores   ACT TOTAL SCORE (Goal Greater than or Equal to 20) 24 24 25   In  the past 12 months, how many times did you visit the emergency room for your asthma without being admitted to the hospital? 0 0 0   In the past 12 months, how many times were you hospitalized overnight because of your asthma? 0 0 0           5/24/2024   General Health   How would you rate your overall physical health? Good   Feel stress (tense, anxious, or unable to sleep) To some extent   (!) STRESS CONCERN      5/24/2024   Nutrition   Three or more servings of calcium each day? Yes   Diet: Regular (no restrictions)   How many servings of fruit and vegetables per day? (!) 2-3   How many sweetened beverages each day? (!) 2         5/24/2024   Exercise   Days per week of moderate/strenous exercise 2 days   Average minutes spent exercising at this level 60 min   (!) EXERCISE CONCERN      5/24/2024   Social Factors   Frequency of gathering with friends or relatives Once a week   Worry food won't last until get money to buy more No   Food not last or not have enough money for food? No   Do you have housing?  Yes   Are you worried about losing your housing? No   Lack of transportation? No   Unable to get utilities (heat,electricity)? No         5/24/2024   Fall Risk   Fallen 2 or more times in the past year? No   Trouble with walking or balance? No          5/24/2024   Dental   Dentist two times every year? Yes         5/24/2024   TB Screening   Were you born outside of the [place]? No           Today's PHQ-2 Score:       2/22/2024    12:41 PM   PHQ-2 ( 1999 [employer])   Q1: Little interest or pleasure in doing things 0   Q2: Feeling down, depressed or hopeless 0   PHQ-2 Score 0         5/24/2024   Substance Use   Alcohol more than 3/day or more than 7/wk Yes   How often do you have a drink containing alcohol 4 or more times a week   How many alcohol drinks on typical day 1 or 2   How often do you have 5+ drinks at one occasion Less than monthly   Audit 2/3 Score 1   How often not able to stop drinking once started Less than  monthly   How often failed to do what normally expected Never   How often needed first drink in am after a heavy drinking session Never   How often feeling of guilt or remorse after drinking Less than monthly   How often unable to remember what happened the night before Never   Have you or someone else been injured because of your drinking No   Has anyone been concerned or suggested you cut down on drinking No   TOTAL SCORE - AUDIT 7   Do you use any other substances recreationally? No     Social History     Tobacco Use    Smoking status: Never     Passive exposure: Never    Smokeless tobacco: Never   Vaping Use    Vaping status: Never Used   Substance Use Topics    Alcohol use: Yes     Alcohol/week: 7.0 - 10.0 standard drinks of alcohol     Types: 7 - 10 Standard drinks or equivalent per week     Comment: usually 1 drink per day, sometimes 2 drinks per day    Drug use: No             5/24/2024   One time HIV Screening   Previous HIV test? Yes         5/24/2024   STI Screening   New sexual partner(s) since last STI/HIV test? No   Last PSA:   Prostate Specific Antigen Screen   Date Value Ref Range Status   05/10/2023 1.37 0.00 - 3.50 ng/mL Final     ASCVD Risk   The 10-year ASCVD risk score (Kaleb CARRION, et al., 2019) is: 7.2%    Values used to calculate the score:      Age: 58 years      Sex: Male      Is Non- : No      Diabetic: No      Tobacco smoker: No      Systolic Blood Pressure: 130 mmHg      Is BP treated: No      HDL Cholesterol: 59 mg/dL      Total Cholesterol: 218 mg/dL         Reviewed and updated as needed this visit by Provider   Tobacco  Allergies  Meds  Problems  Med Hx  Surg Hx  Fam Hx            Past Medical History:   Diagnosis Date    Acute deep vein thrombosis (DVT) of proximal vein of left lower extremity (H) 04/21/2023    Benign essential microscopic hematuria     Bursitis of left hip     External hemorrhoids     Hypertension 05/2023    Uncomplicated  asthma      Past Surgical History:   Procedure Laterality Date    COLONOSCOPY N/A 09/01/2016    Procedure: COLONOSCOPY;  Surgeon: Baldomero Colón MD;  Location: [facility]    ELBOW SURGERY      LASIK Right     ORTHOPEDIC SURGERY      Left knee    ORTHOPEDIC SURGERY Left     ACL repair     Lab work is in process  Labs reviewed in EPIC  BP Readings from Last 3 Encounters:   05/29/24 130/84   05/10/23 123/76   04/21/23 (!) 144/86    Wt Readings from Last 3 Encounters:   05/29/24 90.7 kg (200 lb)   05/10/23 87.5 kg (193 lb)   04/21/23 89.7 kg (197 lb 12.8 oz)                  Patient Active Problem List   Diagnosis    History of DVT (deep vein thrombosis)    Mild persistent asthma without complication    Allergic rhinitis, unspecified seasonality, unspecified trigger    Glaucoma suspect, bilateral    Erectile dysfunction, unspecified erectile dysfunction type    Overweight (BMI 25.0-29.9)     Past Surgical History:   Procedure Laterality Date    COLONOSCOPY N/A 09/01/2016    Procedure: COLONOSCOPY;  Surgeon: Baldomero Colón MD;  Location: [facility]    ELBOW SURGERY      LASIK Right     ORTHOPEDIC SURGERY      Left knee    ORTHOPEDIC SURGERY Left     ACL repair       Social History     Tobacco Use    Smoking status: Never     Passive exposure: Never    Smokeless tobacco: Never   Substance Use Topics    Alcohol use: Yes     Alcohol/week: 7.0 - 10.0 standard drinks of alcohol     Types: 7 - 10 Standard drinks or equivalent per week     Comment: usually 1 drink per day, sometimes 2 drinks per day     Family History   Problem Relation Age of Onset    Heart Disease Mother     Macular Degeneration Mother     Diabetes Father         develop in 80s    Hypertension Father         develop in 80s    Dementia Father     Skin Cancer Father     Coronary Stenting Brother 59    Coronary Artery Disease Brother         2014 - stent    Colon Cancer No family hx of     Prostate Cancer No family hx of     Breast Cancer No family hx of           Current Outpatient Medications   Medication Sig Dispense Refill    albuterol (PROAIR HFA, PROVENTIL HFA, VENTOLIN HFA) 108 (90 BASE) MCG/ACT inhaler Inhale 2 puffs into the lungs every 6 hours      Boswellia-Glucosamine-Vit D (OSTEO BI-FLEX ONE PER DAY PO)       BREO ELLIPTA 100-25 MCG/INH inhaler INHALE 1 PUFF BY MOUTH EVERY DAY      COLLAGEN PO       sildenafil (VIAGRA) 50 MG tablet TAKE 1/2 TO 2 TABLETS BY MOUTH 1 HOUR BEFORE SEXUAL ACTIVITY ON AN EMPTY STOMACH. AVOID GRAPEFRUIT AND ALCOHOL. DO NOT TAKE NITRATES/ 12 tablet 1    TURMERIC PO        No Known Allergies  Recent Labs   Lab Test 05/10/23  0946   A1C 5.0   *   HDL 59   TRIG 108   ALT 19   CR 1.33*   GFRESTIMATED 62   POTASSIUM 4.6   TSH 1.66          Review of Systems  Constitutional, HEENT, cardiovascular, pulmonary, gi and gu systems are negative, except as otherwise noted.     Objective    Exam  /84   Pulse 52   Temp 97.9  F (36.6  C)   Resp 16   Ht 1.829 m (6')   Wt 90.7 kg (200 lb)   SpO2 98%   BMI 27.12 kg/m     Estimated body mass index is 27.12 kg/m  as calculated from the following:    Height as of this encounter: 1.829 m (6').    Weight as of this encounter: 90.7 kg (200 lb).    Physical Exam  GENERAL: alert and no distress  EYES: Eyes grossly normal to inspection, PERRL and conjunctivae and sclerae normal  HENT: ear canals and TM's normal, nose and mouth without ulcers or lesions  NECK: no adenopathy, no asymmetry, masses, or scars  RESP: lungs clear to auscultation - no rales, rhonchi or wheezes  CV: regular rate and rhythm, normal S1 S2, no S3 or S4, no murmur, click or rub, no peripheral edema  ABDOMEN: soft, nontender, no hepatosplenomegaly, no masses and bowel sounds normal  MS: no gross musculoskeletal defects noted, no edema  SKIN: no suspicious lesions or rashes  NEURO: Normal strength and tone, mentation intact and speech normal  PSYCH: mentation appears normal, affect normal/bright        Signed Electronically  by: Missy Mary PA-C

## 2024-05-29 NOTE — PATIENT INSTRUCTIONS
"How to Check Your Blood Pressure at Home    Make sure your blood pressure cuff is validated (produces accurate readings). Go to validateBP.org for a list of blood pressure cuffs.     Take your blood pressure right after you wake up and right before going to bed.     Make sure the readings are before you take your medications, smoke and after you empty your bladder.   Sit with good back support and feet flat on the floor. Have your blood pressure cuff resting at your heart level.     If your numbers are consistently above 140/90, please schedule a follow-up visit to discuss.     Preventive Care Advice   This is general advice we often give to help people stay healthy. Your care team may have specific advice just for you. Please talk to your care team about your own preventive care needs.  Lifestyle  Exercise at least 150 minutes each week (30 minutes a day, 5 days a week).  Do muscle strengthening activities 2 days a week. These help control your weight and prevent disease.  No smoking.  Wear sunscreen to prevent skin cancer.  Have your home tested for radon every 2 to 5 years. Radon is a colorless, odorless gas that can harm your lungs. To learn more, go to www.health.state.[place]. and search for \"Radon in Homes.\"  Keep guns unloaded and locked up in a safe place like a safe or gun vault, or, use a gun lock and hide the keys. Always lock away bullets separately. To learn more, visit [employer].[place].gov and search for \"safe gun storage.\"  Nutrition  Eat 5 or more servings of fruits and vegetables each day.  Try wheat bread, brown rice and whole grain pasta (instead of white bread, rice, and pasta).  Get enough calcium and vitamin D. Check the label on foods and aim for 100% of the RDA (recommended daily allowance).  Regular exams  Have a dental exam and cleaning every 6 months.  See your health care team every year to talk about:  Any changes in your health.  Any medicines your care team has prescribed.  Preventive care, family " planning, and ways to prevent chronic diseases.  Shots (vaccines)   HPV shots (up to age 26), if you've never had them before.  Hepatitis B shots (up to age 59), if you've never had them before.  COVID-19 shot: Get this shot when it's due.  Flu shot: Get a flu shot every year.  Tetanus shot: Get a tetanus shot every 10 years.  Pneumococcal, hepatitis A, and RSV shots: Ask your care team if you need these based on your risk.  Shingles shot (for age 50 and up).  General health tests  Diabetes screening:  Starting at age 35, Get screened for diabetes at least every 3 years.  If you are younger than age 35, ask your care team if you should be screened for diabetes.  Cholesterol test: At age 39, start having a cholesterol test every 5 years, or more often if advised.  Bone density scan (DEXA): At age 50, ask your care team if you should have this scan for osteoporosis (brittle bones).  Hepatitis C: Get tested at least once in your life.  Abdominal aortic aneurysm screening: Talk to your doctor about having this screening if you:  Have ever smoked; and  Are biologically male; and  Are between the ages of 65 and 75.  STIs (sexually transmitted infections)  Before age 24: Ask your care team if you should be screened for STIs.  After age 24: Get screened for STIs if you're at risk. You are at risk for STIs (including HIV) if:  You are sexually active with more than one person.  You don't use condoms every time.  You or a partner was diagnosed with a sexually transmitted infection.  If you are at risk for HIV, ask about PrEP medicine to prevent HIV.  Get tested for HIV at least once in your life, whether you are at risk for HIV or not.  Cancer screening tests  Cervical cancer screening: If you have a cervix, begin getting regular cervical cancer screening tests at age 21. Most people who have regular screenings with normal results can stop after age 65. Talk about this with your provider.  Breast cancer scan (mammogram): If  you've ever had breasts, begin having regular mammograms starting at age 40. This is a scan to check for breast cancer.  Colon cancer screening: It is important to start screening for colon cancer at age 45.  Have a colonoscopy test every 10 years (or more often if you're at risk) Or, ask your provider about stool tests like a FIT test every year or Cologuard test every 3 years.  To learn more about your testing options, visit: www.zLense/455196.pdf.  For help making a decision, visit: mika/wj34821.  Prostate cancer screening test: If you have a prostate and are age 55 to 69, ask your provider if you would benefit from a yearly prostate cancer screening test.  Lung cancer screening: If you are a current or former smoker age 50 to 80, ask your care team if ongoing lung cancer screenings are right for you.  For informational purposes only. Not to replace the advice of your health care provider. Copyright   2023 [employer]. All rights reserved. Clinically reviewed by the [facility] Transitions Program. [employer] 660896 - REV 04/24.    Learning About Stress  What is stress?     Stress is your body's response to a hard situation. Your body can have a physical, emotional, or mental response. Stress is a fact of life for most people, and it affects everyone differently. What causes stress for you may not be stressful for someone else.  A lot of things can cause stress. You may feel stress when you go on a job interview, take a test, or run a race. This kind of short-term stress is normal and even useful. It can help you if you need to work hard or react quickly. For example, stress can help you finish an important job on time.  Long-term stress is caused by ongoing stressful situations or events. Examples of long-term stress include long-term health problems, ongoing problems at work, or conflicts in your family. Long-term stress can harm your health.  How does stress affect your health?  When  you are stressed, your body responds as though you are in danger. It makes hormones that speed up your heart, make you breathe faster, and give you a burst of energy. This is called the fight-or-flight stress response. If the stress is over quickly, your body goes back to normal and no harm is done.  But if stress happens too often or lasts too long, it can have bad effects. Long-term stress can make you more likely to get sick, and it can make symptoms of some diseases worse. If you tense up when you are stressed, you may develop neck, shoulder, or low back pain. Stress is linked to high blood pressure and heart disease.  Stress also harms your emotional health. It can make you pavon, tense, or depressed. Your relationships may suffer, and you may not do well at work or school.  What can you do to manage stress?  You can try these things to help manage stress:   Do something active. Exercise or activity can help reduce stress. Walking is a great way to get started. Even everyday activities such as housecleaning or yard work can help.  Try yoga or arik chi. These techniques combine exercise and meditation. You may need some training at first to learn them.  Do something you enjoy. For example, listen to music or go to a movie. Practice your hobby or do volunteer work.  Meditate. This can help you relax, because you are not worrying about what happened before or what may happen in the future.  Do guided imagery. Imagine yourself in any setting that helps you feel calm. You can use online videos, books, or a teacher to guide you.  Do breathing exercises. For example:  From a standing position, bend forward from the waist with your knees slightly bent. Let your arms dangle close to the floor.  Breathe in slowly and deeply as you return to a standing position. Roll up slowly and lift your head last.  Hold your breath for just a few seconds in the standing position.  Breathe out slowly and bend forward from the  "waist.  Let your feelings out. Talk, laugh, cry, and express anger when you need to. Talking with supportive friends or family, a counselor, or a leonidas leader about your feelings is a healthy way to relieve stress. Avoid discussing your feelings with people who make you feel worse.  Write. It may help to write about things that are bothering you. This helps you find out how much stress you feel and what is causing it. When you know this, you can find better ways to cope.  What can you do to prevent stress?  You might try some of these things to help prevent stress:  Manage your time. This helps you find time to do the things you want and need to do.  Get enough sleep. Your body recovers from the stresses of the day while you are sleeping.  Get support. Your family, friends, and community can make a difference in how you experience stress.  Limit your news feed. Avoid or limit time on social media or news that may make you feel stressed.  Do something active. Exercise or activity can help reduce stress. Walking is a great way to get started.  Where can you learn more?  Go to https://www.ZipRecruiter.net/patiented  Enter N032 in the search box to learn more about \"Learning About Stress.\"  Current as of: October 24, 2023               Content Version: 14.0    0285-8212 [employer].   Care instructions adapted under license by your healthcare professional. If you have questions about a medical condition or this instruction, always ask your healthcare professional. [employer] disclaims any warranty or liability for your use of this information.      Learning About Alcohol Use Disorder  What is alcohol use disorder?  Alcohol use disorder means that a person drinks alcohol even though it causes harm to themselves or others. It can range from mild to severe. The more symptoms of this disorder you have, the more severe it may be. People who have it may find it hard to control their use of " alcohol.  People who have this disorder may argue with others about how much they're drinking. Their job may be affected because of drinking. They may drink when it's dangerous or illegal, such as when they drive. They also may have a strong need, or craving, to drink. They may feel like they must drink just to get by. Their drinking may increase their risk of getting hurt or being in a car crash.  Over time, drinking too much alcohol may cause health problems. These may include high blood pressure, liver problems, or problems with digestion.  What are the symptoms?  Maybe you've wondered about your alcohol habits or how to tell if your drinking is becoming a problem.  Here are some of the symptoms of alcohol use disorder. You may have it if you have two or more of the following symptoms:  You drink larger amounts of alcohol than you ever meant to. Or you've been drinking for a longer time than you ever meant to.  You can't cut down or control your use. Or you constantly wish you could cut down.  You spend a lot of time getting or drinking alcohol or recovering from its effects.  You have strong cravings for alcohol.  You can no longer do your main jobs at work, at school, or at home.  You keep drinking alcohol, even though your use hurts your relationships.  You have stopped doing important activities because of your alcohol use.  You drink alcohol in situations where doing so is dangerous.  You keep drinking alcohol even though you know it's causing health problems.  You need more and more alcohol to get the same effect, or you get less effect from the same amount over time. This is called tolerance.  You have uncomfortable symptoms when you stop drinking alcohol or use less. This is called withdrawal.  Alcohol use disorder can range from mild to severe. The more symptoms you have, the more severe the disorder may be.  You might not realize that your drinking is a problem. You might not drink large amounts when  "you drink. Or you might go for days or weeks between drinking episodes. But even if you don't drink very often, your drinking could still be harmful and put you at risk.  How is alcohol use disorder treated?  Getting help is up to you. But you don't have to do it alone. There are many people and kinds of treatments that can help.  Treatment for alcohol use disorder can include:  Group therapy, one or more types of counseling, and alcohol education.  Medicines that help to:  Reduce withdrawal symptoms and help you safely stop drinking.  Reduce cravings for alcohol.  Support groups. These groups include Alcoholics Anonymous and [employer] (Self-Management and Recovery Training).  Some people are able to stop or cut back on drinking with help from a counselor. People who have moderate to severe alcohol use disorder may need medical treatment. They may need to stay in a hospital or treatment center.  You may have a treatment team to help you. This team may include a psychologist or psychiatrist, counselors, doctors, social workers, nurses, and a . A  helps plan and manage your treatment.  Follow-up care is a key part of your treatment and safety. Be sure to make and go to all appointments, and call your doctor if you are having problems. It's also a good idea to know your test results and keep a list of the medicines you take.  Where can you learn more?  Go to https://www.Tracked.com.net/patiented  Enter H758 in the search box to learn more about \"Learning About Alcohol Use Disorder.\"  Current as of: November 15, 2023               Content Version: 14.0    0860-2059 [employer].   Care instructions adapted under license by your healthcare professional. If you have questions about a medical condition or this instruction, always ask your healthcare professional. [employer] disclaims any warranty or liability for your use of this information.      "

## 2024-05-30 PROBLEM — R79.89 ELEVATED SERUM CREATININE: Status: ACTIVE | Noted: 2024-05-30

## 2024-05-30 LAB
ANION GAP SERPL CALCULATED.3IONS-SCNC: 11 MMOL/L (ref 7–15)
BUN SERPL-MCNC: 17.8 MG/DL (ref 6–20)
CALCIUM SERPL-MCNC: 9.1 MG/DL (ref 8.6–10)
CHLORIDE SERPL-SCNC: 105 MMOL/L (ref 98–107)
CHOLEST SERPL-MCNC: 215 MG/DL
CREAT SERPL-MCNC: 1.43 MG/DL (ref 0.67–1.17)
DEPRECATED HCO3 PLAS-SCNC: 25 MMOL/L (ref 22–29)
EGFRCR SERPLBLD CKD-EPI 2021: 57 ML/MIN/1.73M2
FASTING STATUS PATIENT QL REPORTED: NO
FASTING STATUS PATIENT QL REPORTED: NO
GLUCOSE SERPL-MCNC: 86 MG/DL (ref 70–99)
HDLC SERPL-MCNC: 64 MG/DL
LDLC SERPL CALC-MCNC: 133 MG/DL
NONHDLC SERPL-MCNC: 151 MG/DL
POTASSIUM SERPL-SCNC: 4.3 MMOL/L (ref 3.4–5.3)
PSA SERPL DL<=0.01 NG/ML-MCNC: 1.46 NG/ML (ref 0–3.5)
SODIUM SERPL-SCNC: 141 MMOL/L (ref 135–145)
TRIGL SERPL-MCNC: 90 MG/DL

## 2024-08-21 DIAGNOSIS — N52.9 ERECTILE DYSFUNCTION, UNSPECIFIED ERECTILE DYSFUNCTION TYPE: ICD-10-CM

## 2024-08-21 RX ORDER — SILDENAFIL 50 MG/1
TABLET, FILM COATED ORAL
Qty: 12 TABLET | Refills: 1 | Status: SHIPPED | OUTPATIENT
Start: 2024-08-21

## 2024-12-15 DIAGNOSIS — N52.9 ERECTILE DYSFUNCTION, UNSPECIFIED ERECTILE DYSFUNCTION TYPE: ICD-10-CM

## 2024-12-16 RX ORDER — SILDENAFIL 50 MG/1
TABLET, FILM COATED ORAL
Qty: 12 TABLET | Refills: 1 | Status: SHIPPED | OUTPATIENT
Start: 2024-12-16

## 2025-05-18 DIAGNOSIS — N52.9 ERECTILE DYSFUNCTION, UNSPECIFIED ERECTILE DYSFUNCTION TYPE: ICD-10-CM

## 2025-05-19 RX ORDER — SILDENAFIL 50 MG/1
TABLET, FILM COATED ORAL
Qty: 12 TABLET | Refills: 0 | Status: SHIPPED | OUTPATIENT
Start: 2025-05-19

## 2025-05-30 PROBLEM — R03.0 ELEVATED BLOOD PRESSURE READING WITHOUT DIAGNOSIS OF HYPERTENSION: Status: ACTIVE | Noted: 2025-05-30

## 2025-06-02 ENCOUNTER — PATIENT OUTREACH (OUTPATIENT)
Dept: CARE COORDINATION | Facility: CLINIC | Age: 60
End: 2025-06-02
Payer: COMMERCIAL

## 2025-06-02 ENCOUNTER — RESULTS FOLLOW-UP (OUTPATIENT)
Dept: FAMILY MEDICINE | Facility: CLINIC | Age: 60
End: 2025-06-02

## 2025-06-02 DIAGNOSIS — N18.31 STAGE 3A CHRONIC KIDNEY DISEASE (H): Primary | ICD-10-CM

## 2025-06-02 DIAGNOSIS — E78.2 MIXED HYPERLIPIDEMIA: ICD-10-CM

## 2025-06-02 DIAGNOSIS — R73.01 ELEVATED FASTING GLUCOSE: ICD-10-CM

## 2025-06-02 PROBLEM — R79.89 ELEVATED SERUM CREATININE: Status: RESOLVED | Noted: 2024-05-30 | Resolved: 2025-06-02

## 2025-06-04 ENCOUNTER — PATIENT OUTREACH (OUTPATIENT)
Dept: CARE COORDINATION | Facility: CLINIC | Age: 60
End: 2025-06-04
Payer: COMMERCIAL

## 2025-06-30 ENCOUNTER — NURSE TRIAGE (OUTPATIENT)
Dept: FAMILY MEDICINE | Facility: CLINIC | Age: 60
End: 2025-06-30

## 2025-06-30 ENCOUNTER — APPOINTMENT (OUTPATIENT)
Dept: GENERAL RADIOLOGY | Facility: CLINIC | Age: 60
End: 2025-06-30
Attending: EMERGENCY MEDICINE
Payer: COMMERCIAL

## 2025-06-30 ENCOUNTER — HOSPITAL ENCOUNTER (EMERGENCY)
Facility: CLINIC | Age: 60
Discharge: HOME | End: 2025-06-30
Attending: EMERGENCY MEDICINE | Admitting: EMERGENCY MEDICINE
Payer: COMMERCIAL

## 2025-06-30 ENCOUNTER — ALLIED HEALTH/NURSE VISIT (OUTPATIENT)
Dept: FAMILY MEDICINE | Facility: CLINIC | Age: 60
End: 2025-06-30
Payer: COMMERCIAL

## 2025-06-30 VITALS — SYSTOLIC BLOOD PRESSURE: 134 MMHG | HEART RATE: 77 BPM | DIASTOLIC BLOOD PRESSURE: 84 MMHG | OXYGEN SATURATION: 96 %

## 2025-06-30 VITALS
RESPIRATION RATE: 16 BRPM | OXYGEN SATURATION: 96 % | HEART RATE: 53 BPM | SYSTOLIC BLOOD PRESSURE: 173 MMHG | DIASTOLIC BLOOD PRESSURE: 109 MMHG | TEMPERATURE: 97 F

## 2025-06-30 DIAGNOSIS — R07.9 CHEST PAIN, UNSPECIFIED TYPE: ICD-10-CM

## 2025-06-30 DIAGNOSIS — Z01.30 BP CHECK: Primary | ICD-10-CM

## 2025-06-30 LAB
ANION GAP SERPL CALCULATED.3IONS-SCNC: 10 MMOL/L (ref 7–15)
ATRIAL RATE - MUSE: 59 BPM
BASOPHILS # BLD AUTO: 0 10E3/UL (ref 0–0.2)
BASOPHILS NFR BLD AUTO: 1 %
BUN SERPL-MCNC: 15.4 MG/DL (ref 8–23)
CALCIUM SERPL-MCNC: 9.3 MG/DL (ref 8.8–10.4)
CHLORIDE SERPL-SCNC: 106 MMOL/L (ref 98–107)
CREAT SERPL-MCNC: 1.35 MG/DL (ref 0.67–1.17)
DIASTOLIC BLOOD PRESSURE - MUSE: NORMAL MMHG
EGFRCR SERPLBLD CKD-EPI 2021: 60 ML/MIN/1.73M2
EOSINOPHIL # BLD AUTO: 0.3 10E3/UL (ref 0–0.7)
EOSINOPHIL NFR BLD AUTO: 5 %
ERYTHROCYTE [DISTWIDTH] IN BLOOD BY AUTOMATED COUNT: 11.9 % (ref 10–15)
GLUCOSE SERPL-MCNC: 104 MG/DL (ref 70–99)
HCO3 SERPL-SCNC: 24 MMOL/L (ref 22–29)
HCT VFR BLD AUTO: 44.8 % (ref 40–53)
HGB BLD-MCNC: 16 G/DL (ref 13.3–17.7)
HOLD SPECIMEN: NORMAL
IMM GRANULOCYTES # BLD: 0 10E3/UL
IMM GRANULOCYTES NFR BLD: 0 %
INTERPRETATION ECG - MUSE: NORMAL
LYMPHOCYTES # BLD AUTO: 1.3 10E3/UL (ref 0.8–5.3)
LYMPHOCYTES NFR BLD AUTO: 26 %
MCH RBC QN AUTO: 33.3 PG (ref 26.5–33)
MCHC RBC AUTO-ENTMCNC: 35.7 G/DL (ref 31.5–36.5)
MCV RBC AUTO: 93 FL (ref 78–100)
MONOCYTES # BLD AUTO: 0.7 10E3/UL (ref 0–1.3)
MONOCYTES NFR BLD AUTO: 13 %
NEUTROPHILS # BLD AUTO: 2.7 10E3/UL (ref 1.6–8.3)
NEUTROPHILS NFR BLD AUTO: 55 %
NRBC # BLD AUTO: 0 10E3/UL
NRBC BLD AUTO-RTO: 0 /100
P AXIS - MUSE: 63 DEGREES
PLATELET # BLD AUTO: 167 10E3/UL (ref 150–450)
POTASSIUM SERPL-SCNC: 4.7 MMOL/L (ref 3.4–5.3)
PR INTERVAL - MUSE: 158 MS
QRS DURATION - MUSE: 90 MS
QT - MUSE: 402 MS
QTC - MUSE: 397 MS
R AXIS - MUSE: 52 DEGREES
RBC # BLD AUTO: 4.8 10E6/UL (ref 4.4–5.9)
SODIUM SERPL-SCNC: 140 MMOL/L (ref 135–145)
SYSTOLIC BLOOD PRESSURE - MUSE: NORMAL MMHG
T AXIS - MUSE: 123 DEGREES
TROPONIN T SERPL HS-MCNC: 17 NG/L
TROPONIN T SERPL HS-MCNC: 17 NG/L
VENTRICULAR RATE- MUSE: 59 BPM
WBC # BLD AUTO: 5 10E3/UL (ref 4–11)

## 2025-06-30 PROCEDURE — 99207 PR NO CHARGE NURSE ONLY: CPT

## 2025-06-30 PROCEDURE — 36415 COLL VENOUS BLD VENIPUNCTURE: CPT | Performed by: EMERGENCY MEDICINE

## 2025-06-30 PROCEDURE — 80048 BASIC METABOLIC PNL TOTAL CA: CPT | Performed by: EMERGENCY MEDICINE

## 2025-06-30 PROCEDURE — 99285 EMERGENCY DEPT VISIT HI MDM: CPT | Mod: 25

## 2025-06-30 PROCEDURE — 85004 AUTOMATED DIFF WBC COUNT: CPT | Performed by: EMERGENCY MEDICINE

## 2025-06-30 PROCEDURE — 71046 X-RAY EXAM CHEST 2 VIEWS: CPT

## 2025-06-30 PROCEDURE — 84484 ASSAY OF TROPONIN QUANT: CPT | Performed by: EMERGENCY MEDICINE

## 2025-06-30 PROCEDURE — 3075F SYST BP GE 130 - 139MM HG: CPT

## 2025-06-30 PROCEDURE — 93005 ELECTROCARDIOGRAM TRACING: CPT

## 2025-06-30 PROCEDURE — 3079F DIAST BP 80-89 MM HG: CPT

## 2025-06-30 ASSESSMENT — ACTIVITIES OF DAILY LIVING (ADL)
ADLS_ACUITY_SCORE: 41

## 2025-06-30 NOTE — PROGRESS NOTES
Jarret Hopkins is a 59 year old year old patient who comes in today for a Blood Pressure check because of ongoing blood pressure monitoring.  Vital Signs as repeated by /84  Patient is not prescribed any blood pressure medication.   Patient is not monitoring Blood Pressure at home.  Average readings if yes are N/A  Current complaints: chest pain- see triage encounter 6/30/25.  Disposition:  huddled with provider- patient will go to ER now.    Vickie Mejia RN   [facility]

## 2025-06-30 NOTE — DISCHARGE INSTRUCTIONS
Purchase some omeprazole.  Use this medication as directed daily for 2 weeks.  I suspect this may improve your symptoms.  Follow-up with your primary care provider in the next couple of weeks for recheck on your blood pressure.

## 2025-06-30 NOTE — ED PROVIDER NOTES
Emergency Department Note      History of Present Illness     Chief Complaint   Chest Pain      HPI   Jarret Hopkins is a 59 year old male who presents with one week of intermittent chest pressure. The patient explains noticing his chest pressure when he wakes up in the morning and at night, usually when he is at rest. Once he starts his day, he does not feel any pain. He mentions recent life stressors with his father in hospice, work and his children moving for college. He plays hockey once a week and played pickle ball yesterday but did not have any pain with this activity. The patient does reports drinking about 2 alcoholic drinks every day, both beer and liquor. His blood pressure at his last PCP visit was 150-138 systolic. The patient denies dietary changes, tobacco use, marijuana use, leg pain or swelling, or regularly checking his BP. Reports history of normal stress test. His brother had a cardiac stent placed in his 50's. Patient does not use his rescue inhaler regularly but does use his Breo inhaler daily.     Independent Historian   None    Review of External Notes       Past Medical History     Medical History and Problem List   Acute DVT in left lower extremity  Left hip bursitis  Hypertension  Asthma    Medications   Breo ellipta inhaler  Viagra    Surgical History   Colonoscopy   LASIK  Elbow surgery  Left ACL repair    Physical Exam     Patient Vitals for the past 24 hrs:   BP Temp Temp src Pulse Resp SpO2   06/30/25 1130 (!) 173/109 -- -- 53 16 96 %   06/30/25 1100 138/88 -- -- 55 16 97 %   06/30/25 1027 (!) 170/101 97  F (36.1  C) Temporal 52 16 99 %     Physical Exam  Gen: well appearing, in no acute distress  Oral : Mucous membranes moist,   Nose: No rhinorhea  Ears: External near normal, without drainage  Eyes: periorbital tissues and sclera normal   Neck: supple, no abnormal swelling  Lungs: Clear bilaterally, no tachypnea or distress, speaks full sentences  CV: Regular rate, regular  rhythm  Abd: soft, nontender, nondistended, no rebound/guarding  Ext: no lower extremity edema  Skin: warm, dry, well perfused, no rashes/bruising/lesions on exposed skin  Neuro: alert, no gross motor or sensory deficits,   Psych: pleasant mood, normal affect    Diagnostics     Lab Results   Labs Ordered and Resulted from Time of ED Arrival to Time of ED Departure   BASIC METABOLIC PANEL - Abnormal       Result Value    Sodium 140      Potassium 4.7      Chloride 106      Carbon Dioxide (CO2) 24      Anion Gap 10      Urea Nitrogen 15.4      Creatinine 1.35 (*)     GFR Estimate 60 (*)     Calcium 9.3      Glucose 104 (*)    CBC WITH PLATELETS AND DIFFERENTIAL - Abnormal    WBC Count 5.0      RBC Count 4.80      Hemoglobin 16.0      Hematocrit 44.8      MCV 93      MCH 33.3 (*)     MCHC 35.7      RDW 11.9      Platelet Count 167      % Neutrophils 55      % Lymphocytes 26      % Monocytes 13      % Eosinophils 5      % Basophils 1      % Immature Granulocytes 0      NRBCs per 100 WBC 0      Absolute Neutrophils 2.7      Absolute Lymphocytes 1.3      Absolute Monocytes 0.7      Absolute Eosinophils 0.3      Absolute Basophils 0.0      Absolute Immature Granulocytes 0.0      Absolute NRBCs 0.0     TROPONIN T, HIGH SENSITIVITY - Normal    Troponin T, High Sensitivity 17     TROPONIN T, HIGH SENSITIVITY - Normal    Troponin T, High Sensitivity 17       Imaging   Chest XR,  PA & LAT   Final Result   IMPRESSION: Negative chest.        EKG   ECG taken at 1025, ECG read at 1030  Sinus bradycardia. Possible left atrial enlargement. T wave abnormality, consider lateral ischemia. Abnormal ECG.  Rate 59 bpm. MS interval 158 ms. QRS duration 90 ms. QT/QTc 402/397 ms. P-R-T axes 63 52 123.    Independent Interpretation   CXR: No pneumothorax or infiltrate.    ED Course      Medications Administered   Medications - No data to display    Procedures   Procedures     Discussion of Management   None    ED Course   ED Course as of  06/30/25 1415   Mon Jun 30, 2025   1123 I obtained history and examined the patient as noted above.     1310 I rechecked the patient and explained findings. We discussed plan for discharge and patient is in agreement with plan.        Additional Documentation  None    Medical Decision Making / Diagnosis     CMS Diagnoses: None    MIPS   None               MDM   Jarret Hopkins is a 59 year old male presents with chest pain, pressure-like feeling more common overnight and into the morning.  He is not feeling it when he is exerting himself playing hockey or doing other activities.  No prior history of coronary artery disease, has been told his blood pressure is borderline in the past.  He is mildly hypertensive today, I see no evidence of hypertensive emergency.  His troponins are unchanging and within normal limits, he is not having classical anginal symptoms.  I think ACS, PE and dissection are low likelihood at this point.  We did discuss doing a follow-up in clinic for recheck on his blood pressure in the next couple of weeks, we discussed if his blood pressure is still above the 130 range he may need to start a medication as an outpatient.  With regards to his chest discomfort we will add in a PPI since the pain seems to be predominantly overnight and early in the mornings.  Will try that while he waits for follow-up with his PCP.     Disposition   The patient was discharged.     Diagnosis     ICD-10-CM    1. Chest pain, unspecified type  R07.9            Discharge Medications   Discharge Medication List as of 6/30/2025  1:16 PM            Scribe Disclosure:  I, Makenzie Aguilar, am serving as a scribe at 12:11 PM on 6/30/2025 to document services personally performed by Asif Huber MD based on my observations and the provider's statements to me.        Asif Huber MD  06/30/25 [id number]

## 2025-06-30 NOTE — TELEPHONE ENCOUNTER
Nurse Triage SBAR    Situation: Intermittent chest pain and shortness of breath    Background: Patient had a stress test in 2013 due to family history of heart problems. Hx of high cholesterol, elevated BP, and asthma.     Assessment: Reports daily intermittent chest pain over his sternum for the last couple weeks. He rates the pain 1-3/10. He notices the pain most when at rest. He does not notice the pain when playing hockey or otherwise active. Reports intermittent mild shortness of breath. Denies dizziness, nausea, vomiting, sweating, fever, or cough. Patient reports his dad is on hospice currently and he as increased stress in his life.    Recommendation: Disposition is call ADS/GO to ED/[facility] Now (Or to Office with PCP approval). Huddled with patient's PCP. She recommends patient go to ER now. Reviewed with patient, since he was here for a BP check. He is agreeable and will go to [facility] ER now.     Vickie Mejia RN   [facility]    Additional Information   Negative: SEVERE difficulty breathing (e.g., struggling for each breath, speaks in single words)   Negative: Difficult to awaken or acting confused (e.g., disoriented, slurred speech)   Negative: Shock suspected (e.g., cold/pale/clammy skin, too weak to stand, low BP, rapid pulse)   Negative: Passed out (i.e., lost consciousness, collapsed and was not responding)   Negative: Chest pain lasting longer than 5 minutes and ANY of the following:         Pain is crushing, pressure-like, or heavy         Over 44 years old          Over 30 years old and one cardiac risk factor (e.g diabetes, high blood pressure, high cholesterol, smoker, or family history of heart disease)         History of heart disease (e.g. angina, heart attack, heart failure, bypass surgery, takes nitroglycerin)   Negative: Heart beating < 50 beats per minute OR > 140 beats per minute   Negative: Visible sweat on face or sweat dripping down face   Negative: Sounds  "like a life-threatening emergency to the triager   Negative: Followed an injury to chest   Negative: SEVERE chest pain   Negative: Pain also in shoulder(s) or arm(s) or jaw   Negative: Difficulty breathing   Negative: Cocaine use within last 3 days   Negative: Major surgery in the past month   Negative: Hip or leg fracture (broken bone) in past month (or had cast on leg or ankle in past month)   Negative: Illness requiring prolonged bedrest in past month (e.g., immobilization, long hospital stay)   Negative: Long-distance travel in past month (e.g., car, bus, train, plane; with trip lasting 6 or more hours)   Negative: History of prior 'blood clot' in leg or lungs (i.e., deep vein thrombosis, pulmonary embolism)   Negative: History of inherited increased risk of blood clots (e.g., Factor 5 Leiden, Anti-thrombin 3, Protein C or Protein S deficiency, Prothrombin mutation)   Negative: Cancer treatment in the past two months (or has cancer now)   Negative: Heart beating irregularly or very rapidly    Answer Assessment - Initial Assessment Questions  1. LOCATION: \"Where does it hurt?\"        Sternum   2. RADIATION: \"Does the pain go anywhere else?\" (e.g., into neck, jaw, arms, back)      Denies radiation.  3. ONSET: \"When did the chest pain begin?\" (Minutes, hours or days)       A couple weeks ago- happens daily  4. PATTERN: \"Does the pain come and go, or has it been constant since it started?\"  \"Does it get worse with exertion?\"       Mostly in the morning or evening, but usually worse while laying down. When active, don't notice it as much  5. DURATION: \"How long does it last\" (e.g., seconds, minutes, hours)      Minutes to hours  6. SEVERITY: \"How bad is the pain?\"  (e.g., Scale 1-10; mild, moderate, or severe)     - MILD (1-3): doesn't interfere with normal activities      - MODERATE (4-7): interferes with normal activities or awakens from sleep     - SEVERE (8-10): excruciating pain, unable to do any normal " "activities        Right now the pain is 1/10. Worse it gets is 3/10.   7. CARDIAC RISK FACTORS: \"Do you have any history of heart problems or risk factors for heart disease?\" (e.g., angina, prior heart attack; diabetes, high blood pressure, high cholesterol, smoker, or strong family history of heart disease)      Hx HTN and high cholesterol.  8. PULMONARY RISK FACTORS: \"Do you have any history of lung disease?\"  (e.g., blood clots in lung, asthma, emphysema, birth control pills)      Asthma   9. CAUSE: \"What do you think is causing the chest pain?\"      Anxiety?  10. OTHER SYMPTOMS: \"Do you have any other symptoms?\" (e.g., dizziness, nausea, vomiting, sweating, fever, difficulty breathing, cough)        Denies dizziness, nausea, vomiting, sweating, fever, cough. Reports mild shortness of breath intermittently.   11. PREGNANCY: \"Is there any chance you are pregnant?\" \"When was your last menstrual period?\"        N/A    Protocols used: Chest Pain-A-[place]    "

## 2025-06-30 NOTE — TELEPHONE ENCOUNTER
Reason for Disposition    Chest pain lasting longer than 5 minutes and occurred in last 3 days (72 hours) (Exception: Feels exactly the same as previously diagnosed heartburn and has accompanying sour taste in mouth.)    Protocols used: Chest Pain-A-[place]

## 2025-06-30 NOTE — ED TRIAGE NOTES
Pt c/o CP for the last 2 weeks, mild SOB noted, no cardiac hx, ABCD intact.       Triage Assessment (Adult)       Row Name 06/30/25 1025          Triage Assessment    Airway WDL WDL        Respiratory WDL    Respiratory WDL WDL        Skin Circulation/Temperature WDL    Skin Circulation/Temperature WDL WDL        Cardiac WDL    Cardiac WDL X        Peripheral/Neurovascular WDL    Peripheral Neurovascular WDL WDL        Cognitive/Neuro/Behavioral WDL    Cognitive/Neuro/Behavioral WDL WDL